# Patient Record
Sex: FEMALE | Race: WHITE
[De-identification: names, ages, dates, MRNs, and addresses within clinical notes are randomized per-mention and may not be internally consistent; named-entity substitution may affect disease eponyms.]

---

## 2019-01-27 ENCOUNTER — HOSPITAL ENCOUNTER (INPATIENT)
Dept: HOSPITAL 80 - FED | Age: 24
LOS: 3 days | Discharge: HOME | DRG: 885 | End: 2019-01-30
Attending: PSYCHIATRY & NEUROLOGY | Admitting: PSYCHIATRY & NEUROLOGY
Payer: COMMERCIAL

## 2019-01-27 DIAGNOSIS — F33.3: Primary | ICD-10-CM

## 2019-01-27 DIAGNOSIS — G89.29: ICD-10-CM

## 2019-01-27 DIAGNOSIS — F43.10: ICD-10-CM

## 2019-01-27 DIAGNOSIS — Z72.0: ICD-10-CM

## 2019-01-27 DIAGNOSIS — F41.0: ICD-10-CM

## 2019-01-27 DIAGNOSIS — Z23: ICD-10-CM

## 2019-01-27 DIAGNOSIS — G80.4: ICD-10-CM

## 2019-01-27 LAB — PLATELET # BLD: 327 10^3/UL (ref 150–400)

## 2019-01-27 PROCEDURE — G0009 ADMIN PNEUMOCOCCAL VACCINE: HCPCS

## 2019-01-27 PROCEDURE — G0480 DRUG TEST DEF 1-7 CLASSES: HCPCS

## 2019-01-27 NOTE — ASMTTCLDSP
[facility] Discharge Disposition

 

Disposition:                  Answers:  Admit                                 

Discharge                     

Concerns/Recommendations:     

Notes:

In consultation with [facility] ED physician, Dr Ferrell and on-call psychiatrist, Dr Foote  , both 

concurred that Pt does appear to meet 27-65 criteria requiring psychiatric hospitalization as Pt 

does appear to be an imminent risk of harm to self/ due to a mental illness condition.

Was patient given the         Answers:  Yes                                   

Inpatient Behavioral                                                          

Health Prohibited                                                             

Belongings List while in                                                      

the ED?                                                                       

For inpatient                 Dr Foote

admission, the following      

psychiatrist agreed to        

accept patient for            

admission to Behavioral       

Health ([facility]):              

Type of Hold:                 Answers:  M1/72-hour Hold                       

Hold initiated by:            Answers:  Other                         Notes:  Clinician

 

Date Signed:  01/27/2019 06:14 PM

Electronically Signed By:Marine Simmons
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
## 2019-01-27 NOTE — EDPHY
H & P


Stated Complaint: SI/plan


Time Seen by Provider: 01/27/19 13:27


HPI/ROS: 





CHIEF COMPLAINT:  Feeling suicidal





HISTORY OF PRESENT ILLNESS:  The patient presents the ED with suicidal thoughts 

with a plan to step in front of a moving vehicle or drive her vehicle into a 

fixed object at a high rate of speed.  The patient has a history of chronic 

intermittent suicidal thoughts.  She also has depression.  She reports that she 

has been compliant with her regular medications.  The patient denies any acute 

medical complaints.  The patient comes to the emergency department today 

because she feels that she requires psychiatric hospitalization for further 

stabilization.





REVIEW OF SYSTEMS:


A comprehensive 10 point review of systems is otherwise negative aside from 

elements mentioned in the history of present illness.








Source: Patient


Exam Limitations: No limitations





- Personal History


LMP (Females 10-55): Over 28 Days Ago


Current Tetanus/Diphtheria Vaccine: Yes


Tetanus Vaccine Date: per patient report 2012





- Medical/Surgical History


Hx Asthma: No


Hx Chronic Respiratory Disease: No


Hx Diabetes: No


Hx Cardiac Disease: No


Hx Renal Disease: No


Hx Cirrhosis: No


Hx Alcoholism: No


Hx HIV/AIDS: No


Hx Splenectomy or Spleen Trauma: No


Other PMH: HPV, CPalsy, MULTIPLE ORTHO SURGERIES, ANXIETY, DEPRESSION, PTSD\

Past hx drug abuse





- Social History


Smoking Status: Heavy smoker





- Physical Exam


Exam: 





General Appearance:  Alert, no distress


Eyes:  Pupils equal and round no pallor or injection


ENT, Mouth:  Mucous membranes moist


Respiratory:  There are no retractions, lungs are clear to auscultation


Cardiovascular:  Regular rate and rhythm


Gastrointestinal:  Abdomen is soft and nontender, no masses, bowel sounds normal


Neurological:  A&O, normal motor function, normal sensory exam, normal cranial 

nerves


Skin:  Warm and dry, no rashes


Musculoskeletal:  Neck is supple nontender


Extremities:  symmetrical, full range of motion


Psychiatric:  Endorses symptoms of depression, suicidal thoughts and a plan.  

Not agitated, normal thought process, conversant and cooperative








Constitutional: 


 Initial Vital Signs











Temperature (C)  36.5 C   01/27/19 13:22


 


Heart Rate  118 H  01/27/19 13:22


 


Respiratory Rate  18   01/27/19 13:22


 


Blood Pressure  141/92 H  01/27/19 13:22


 


O2 Sat (%)  97   01/27/19 13:22








 











O2 Delivery Mode               Room Air














Allergies/Adverse Reactions: 


 





amitriptyline Allergy (Verified 01/27/19 13:26)


 


cyclobenzaprine [From Flexeril] Allergy (Verified 01/27/19 13:26)


 


dicyclomine [From Bentyl] Allergy (Verified 01/27/19 13:26)


 


ibuprofen Allergy (Verified 01/27/19 13:26)


 


ketorolac Allergy (Verified 01/27/19 13:26)


 


latex Allergy (Verified 01/27/19 13:26)


 


naproxen Allergy (Verified 01/27/19 13:26)


 


olanzapine [From Zyprexa] Allergy (Verified 01/27/19 13:26)


 


ziprasidone [From Geodon] Allergy (Verified 01/27/19 13:26)


 








Home Medications: 














 Medication  Instructions  Recorded


 


Albuterol Sulfate [Ventolin Hfa] 1 - 2 puffs IH Q4H PRN 01/27/19


 


Clindamycin 1% [Cleocin 1%] 1 sophie TP DAILY PRN 01/27/19


 


Etonogestrel [Nexplanon] 68 mg SQ ONCE 01/27/19


 


Fluticasone Nasal [Flonase Nasal 2 sprays EACHNARE DAILY 01/27/19





Spray (RX)]  


 


Omeprazole 40 mg PO DAILY 01/27/19


 


PARoxetine HCL [Paxil 30mg (*)] 30 mg PO HS 01/27/19


 


risperiDONE [RisperDAL] 1 mg PO HS 01/27/19














Medical Decision Making


ED Course/Re-evaluation: 





The patient presents to the ED with suicidal thoughts.  She denies any 

ingestion.  She denies any acute medical complaints.





The patient was placed on a mental health detainer in the emergency department.

  She was medically cleared for psychiatric evaluation.





The patient was evaluated by the mental health team.  She has been placed on a 

72 hr mental health hold with a plan for inpatient psychiatric hospitalization.





5:30 p.m.:  The patient has been accepted for involuntary psychiatric 

hospitalization by Dr. Foote at [facility].  I have filled out 

the EMTALA transfer form.














Differential Diagnosis: 





Differential diagnosis considered includes depression, suicidal ideation, 

overdose, metabolic derangement





- Data Points


Laboratory Results: 


 Laboratory Results





 01/27/19 13:50 





 01/27/19 13:50 





 











  01/27/19 01/27/19 01/27/19





  15:20 13:50 13:50


 


WBC      





    


 


RBC      





    


 


Hgb      





    


 


Hct      





    


 


MCV      





    


 


MCH      





    


 


MCHC      





    


 


RDW      





    


 


Plt Count      





    


 


MPV      





    


 


Neut % (Auto)      





    


 


Lymph % (Auto)      





    


 


Mono % (Auto)      





    


 


Eos % (Auto)      





    


 


Baso % (Auto)      





    


 


Nucleat RBC Rel Count      





    


 


Absolute Neuts (auto)      





    


 


Absolute Lymphs (auto)      





    


 


Absolute Monos (auto)      





    


 


Absolute Eos (auto)      





    


 


Absolute Basos (auto)      





    


 


Absolute Nucleated RBC      





    


 


Immature Gran %      





    


 


Immature Gran #      





    


 


Sodium      138 mEq/L mEq/L





     (135-145) 


 


Potassium      3.9 mEq/L mEq/L





     (3.5-5.2) 


 


Chloride      105 mEq/L mEq/L





     () 


 


Carbon Dioxide      21 mEq/l L mEq/l





     (22-31) 


 


Anion Gap      12 mEq/L mEq/L





     (6-14) 


 


BUN      16 mg/dL mg/dL





     (7-23) 


 


Creatinine      0.7 mg/dL mg/dL





     (0.6-1.0) 


 


Estimated GFR      > 60 





    


 


Glucose      97 mg/dL mg/dL





     () 


 


Calcium      10.1 mg/dL mg/dL





     (8.5-10.4) 


 


Beta HCG, Qual    NEGATIVE   





    


 


Urine Opiates Screen  NEGATIVE     





   (NEGATIVE)   


 


Urine Barbiturates  NEGATIVE     





   (NEGATIVE)   


 


Ur Phencyclidine Scrn  NEGATIVE     





   (NEGATIVE)   


 


Ur Amphetamine Screen  NEGATIVE     





   (NEGATIVE)   


 


U Benzodiazepines Scrn  NEGATIVE     





   (NEGATIVE)   


 


Urine Cocaine Screen  NEGATIVE     





   (NEGATIVE)   


 


U Marijuana (THC) Screen  NON-NEGATIVE  H     





   (NEGATIVE)   


 


Ethyl Alcohol      < 10 mg/dL mg/dL





     (0-10) 














  01/27/19





  13:50


 


WBC  6.79 10^3/uL 10^3/uL





   (3.80-9.50) 


 


RBC  4.90 10^6/uL 10^6/uL





   (4.18-5.33) 


 


Hgb  14.8 g/dL g/dL





   (12.6-16.3) 


 


Hct  44.5 % %





   (38.0-47.0) 


 


MCV  90.8 fL fL





   (81.5-99.8) 


 


MCH  30.2 pg pg





   (27.9-34.1) 


 


MCHC  33.3 g/dL g/dL





   (32.4-36.7) 


 


RDW  12.4 % %





   (11.5-15.2) 


 


Plt Count  327 10^3/uL 10^3/uL





   (150-400) 


 


MPV  9.1 fL fL





   (8.7-11.7) 


 


Neut % (Auto)  72.7 % %





   (39.3-74.2) 


 


Lymph % (Auto)  21.9 % %





   (15.0-45.0) 


 


Mono % (Auto)  4.9 % %





   (4.5-13.0) 


 


Eos % (Auto)  0.1 % L %





   (0.6-7.6) 


 


Baso % (Auto)  0.3 % %





   (0.3-1.7) 


 


Nucleat RBC Rel Count  0.0 % %





   (0.0-0.2) 


 


Absolute Neuts (auto)  4.93 10^3/uL 10^3/uL





   (1.70-6.50) 


 


Absolute Lymphs (auto)  1.49 10^3/uL 10^3/uL





   (1.00-3.00) 


 


Absolute Monos (auto)  0.33 10^3/uL 10^3/uL





   (0.30-0.80) 


 


Absolute Eos (auto)  0.01 10^3/uL L 10^3/uL





   (0.03-0.40) 


 


Absolute Basos (auto)  0.02 10^3/uL 10^3/uL





   (0.02-0.10) 


 


Absolute Nucleated RBC  0.00 10^3/uL 10^3/uL





   (0-0.01) 


 


Immature Gran %  0.1 % %





   (0.0-1.1) 


 


Immature Gran #  0.01 10^3/uL 10^3/uL





   (0.00-0.10) 


 


Sodium  





  


 


Potassium  





  


 


Chloride  





  


 


Carbon Dioxide  





  


 


Anion Gap  





  


 


BUN  





  


 


Creatinine  





  


 


Estimated GFR  





  


 


Glucose  





  


 


Calcium  





  


 


Beta HCG, Qual  





  


 


Urine Opiates Screen  





  


 


Urine Barbiturates  





  


 


Ur Phencyclidine Scrn  





  


 


Ur Amphetamine Screen  





  


 


U Benzodiazepines Scrn  





  


 


Urine Cocaine Screen  





  


 


U Marijuana (THC) Screen  





  


 


Ethyl Alcohol  





  














Departure





- Departure


Disposition: [facility] IP


Clinical Impression: 


 Suicidal ideation





Condition: Good


Referrals: 


NONE *PRIMARY CARE P,. [Primary Care Provider] - As per Instructions

## 2019-01-28 RX ADMIN — FLUTICASONE PROPIONATE SCH: 50 SPRAY, METERED NASAL at 14:16

## 2019-01-28 RX ADMIN — PANTOPRAZOLE SODIUM SCH MG: 40 TABLET, DELAYED RELEASE ORAL at 09:44

## 2019-01-28 NOTE — PDMN
Medical Necessity


Medical necessity: [facility] [place] [id number], Major Depressive Disorder, Adult: Inpatient Care

, 3 days:  22 yo w/ major depressive do, recurrent, severe, suicidal ideation, 

risk of self harm, M1 hold.  Other dx include PTSD and borderline personality d/

o.

## 2019-01-28 NOTE — GCON
MEDICINE CONSULTATION



DATE OF CONSULTATION:  01/27/2019



This is a medicine consultation at the request of Behavioral Health for general medical evaluation an
d management prior to behavioral health hospitalization.



CHIEF COMPLAINT:  Suicidal.



HISTORY:  This is a 23-year-old female who has a past medical history that includes cerebral palsy, a
s well as anxiety, depression, and PTSD.  The patient notes that she has chronic pain due to her cere
bral palsy and multiple surgical interventions in the past.  She presents to the ER with concerns tammy
t she is going to kill herself.  She states she had a plan to throw herself in front of a car.  At th
e time of my evaluation, the patient is somewhat anxious and tearful and states she feels as if she m
ight cry or scream, but otherwise states that she feels safe and is glad that she is here to get some
 help.  She denies any acute medical concerns at this time.



PAST MEDICAL HISTORY:  Includes:

1.  Cerebral palsy.

2.  HPV.

3.  Anxiety.

4.  Depression.

5.  PTSD.

6.  Prior suicidal ideation.



PAST SURGICAL HISTORY:  Multiple surgeries from her childhood on involving the waist down.  She notes
 essentially every tendon, muscle, etc., has been operated on at some point or another due to her cer
ebral palsy.



FAMILY HISTORY:  Patient is adopted and her birth family history is unknown.



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  She is a d
aily heavy smoker.  She drinks alcohol occasionally.  She denies other drug use, but has a prior hist
ory of polysubstance abuse.



REVIEW OF SYSTEMS:  A 10-point review of systems obtained and negative except as per HPI.



HOME MEDICATIONS:  Include:

1.  Clindamycin topical cream.

2.  Flonase nasal spray.

3.  Nexplanon.

4.  Albuterol.

5.  Risperdal.

6.  Paxil.

7.  Omeprazole.



ALLERGIES:  Multiple.  Please see the EHR for further details.



PHYSICAL EXAM:  VITAL SIGNS:  /89, heart rate 92, respiratory rate 16, O2 sat is 98% on room ai
r, temperature is 36.7.  GENERAL APPEARANCE:  This is a young  female.  She is awake and erinn
rt.  She is in no acute distress.  EYES:  Anicteric.  HENT:  Oropharynx clear.  CARDIOVASCULAR:  Regu
lar rate and rhythm.  No MRG.  PULMONARY:  CTA bilaterally.  Normal work of breathing.  ABDOMEN:  Sof
t, nontender, nondistended.  EXTREMITIES:  No clubbing, cyanosis, or edema.  SKIN:  Warm, dry, well p
erfused.  NEURO/PSYCH:  Anxious, otherwise oriented and appropriate.



CLINICAL DATA:  Labs reviewed.  CBC is unremarkable.  Chemistry likewise unremarkable.  Beta HCG is n
egative.  Tox screen is positive for marijuana.



ASSESSMENT/PLAN:  This is a 23-year-old female with a past medical history of cerebral palsy and 
karen pain as well as anxiety, depression, posttraumatic stress disorder and prior suicidal ideation, p
resenting with suicidal ideation.



PROBLEM:  

1.  Suicidal ideation.  The patient presents with acute on chronic suicidal ideation with a plan to t
hrow herself in front of a quickly moving car.  She is tearful and anxious on exam.  She does not fee
l safe to return home and has been accepted to the behavioral health unit for medical stabilization. 
 I do not see any medical reason to defer any treatment felt to be indicated by the psychiatric servi
ce and further treatment will be deferred to their discretion.  

2.  Chronic pain.  She has been managing this with Tylenol and physical therapy.  She does admit that
 she has not been compliant with physical therapy as late and this likely would be a good choice for 
her moving forward.

3.  Cerebral palsy with gait instability and history of multiple surgeries and chronic pain as per ab vlilavicencio.  Again, recommending PT.

4.  Anxiety, depression, PTSD.  As per problem #1, this will be per the discretion of her behavioral 
health team. 



Thank you for this consultation.  Medicine will be available peripherally.  Should questions arise du
ring patient's hospitalization.  



The patient is new to my care.  Old records reviewed, summarized as per HPI and past medical history.
  Care plan reviewed with ER physician.





Job #:  [id number]

## 2019-01-28 NOTE — ASMTBHMTP
Master Treatment Plan

 

Master Treatment Plan         Answers:  Depressed Mood with                   

for:                                    Suicidal Ideation                     

Date:                         01/28/2019

Diagnosis on Admission:       Major Depressive Disorder, Recurrent, Severe 296.33 

                              (F33.2)

Expected length of stay:      3-5 days

Reason for admission:         

Notes:

Per Report:



Pt is a 22 y/o female who presents to the ED voluntarily due to concerns of self-harm. Pt has an 

extensive hx of complicated trauma and was never able to develop a secure attachment due to some of 


this trauma occuring at a very young age. Clinician placed pt on an M1 following the 

evaluation.  Per M1, "Pt has multiple past suicide attempts, including 1 in a hospital.  Her SI has 


been increasing in both frequency and intensity this past month. She has multiple plans.  Presented 


voluntarily due to her fear of self-harm".  



Pt has an extensive psychiatric hx with over 20 hospitalizations and multiple suicide 

attempts.  Pt's last hospitalization was at [facility] in June 2018.  Pt stated a suicide 

attempt brought her to the hospital and that while she was in [facility] she attempted 

suicide; she was found unconscious. Pt cannot recall what either attempt was.  Prior to that 

hospitalization she had been receiving ECT for approximately 6 months.  Pt reports a baseline of 

severe depression and anxiety, but a noticeable increase in her ability to function over the last 

few months.  She has held a job since September, able to buy herself a car and begin to pay off 

loans.  This past month she observed that her SI, which is ongoing, was increasing in both 

frequency and intensity.  This past week she noticed agitation and irritability; these are moods 

that are not usual for her. She and her therapist cannot link her changes in mood with a specific 

event and were surprised at their presence given her personal achievements. She endorsed an 

increase in hopelessness, guilt, worthlessness,difficulty focusing, difficulty making 

decisions, difficulty enjoying herself and a decrease in energy. She reports great difficulty 

sleeping.  She struggles to fall asleep, stay asleep and has nightmares. Her appetite vascillates 

and has decreased over the past 2 weeks. During the evaluation she presented multiple plans for 

killing herself which include using her car and drinking chemicals. She presents these to the 

clinician with a dissasociated bright affect.(she has been tearful several times) Her therapist has 


been encouraging her to be hospitalized.  She was pleased to be pro-active and come in voluntary. 



 Pt has a hx of cutting, but has not cut since at least last June.  She does find herself 

scratching herself when her PTSD is triggered. 



Pt has generalized anxiety which takes the form of continual worrying and questioning of 

herself.  She also experiences multiple symptoms of PTSD which include triggering, intrusive 

memories, hypervigilence and nightmares.  She has been given the diagnosis of reactive attachment 

disorder and borderline personality disorder in the past; these both suggest that she may struggle 

to regulate her emotions.  Clinician did ask her if her suicidal acts were more impulsive, as a 

reaction to an event or did the intent grow  over time; she believes that the intent  grows over 

time.

Patient's stated              

presenting problems:          

Notes:

"suicidal ideations."

Patient's goals for           

treatment:                    

Notes:

to get better

Patient's strengths:          

Notes:

none

Identify supports outside     

of hospital:                  

Notes:

family and friends

Discharge criteria:           

Notes:

Suicidal Ideation will resolve and patient will have a plan to safely manage recurrent suicidal 

ideation.

Initial disposition           

plan/considerations:          

Notes:

Possibly return home with parents, don't know yet.

Master Treatment Plan Required Signatures

 

Psychiatrist signature:       Answers:  Psychiatrist: ______________________________

RN on-shift signature:        Answers:  RN: ____________________________________

Patient signature:            Answers:  Patient: ____________________________________

 

Date Signed:  01/28/2019 07:41 AM

Electronically Signed By:Evelio Castellanos
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
## 2019-01-29 RX ADMIN — PANTOPRAZOLE SODIUM SCH MG: 40 TABLET, DELAYED RELEASE ORAL at 08:47

## 2019-01-29 RX ADMIN — ACETAMINOPHEN SCH MG: 500 TABLET ORAL at 19:31

## 2019-01-29 RX ADMIN — FLUTICASONE PROPIONATE SCH SPRAYS: 50 SPRAY, METERED NASAL at 09:03
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
## 2019-01-30 VITALS — DIASTOLIC BLOOD PRESSURE: 58 MMHG | SYSTOLIC BLOOD PRESSURE: 129 MMHG

## 2019-01-30 RX ADMIN — PANTOPRAZOLE SODIUM SCH MG: 40 TABLET, DELAYED RELEASE ORAL at 08:15

## 2019-01-30 RX ADMIN — FLUTICASONE PROPIONATE SCH SPRAYS: 50 SPRAY, METERED NASAL at 08:15

## 2019-01-30 RX ADMIN — ACETAMINOPHEN SCH MG: 500 TABLET ORAL at 08:14

## 2019-01-30 NOTE — BDS
[f 
rep st]



                                                  BEHAVIORAL HEALTH DISCHARGE 
SUMMARY





REASON FOR ADMISSION:  From the ED note dated 01/27/2019, patient presented to 
the emergency department with suicidal thoughts with a plan to step in front of 
a moving vehicle or drive her vehicle into a fixed object at a high rate of 
speed.  The patient was admitted involuntarily on an M1 hold due to being a 
danger to herself.  The patient was admitted for safety, crisis stabilization, 
and medication management.



ADMITTING DIAGNOSES:  

1.  Major depressive disorder, recurrent episode, severe, with anxious distress.

2.  Posttraumatic stress disorder. 

3.  Panic disorder.



ADMISSION PHYSICAL EXAM:  The patient was seen for history and physical on 01/27
/2019, by Internal Medicine consultation for medical clearance for inpatient 
psychiatric hospitalization and treatment.  The patient was medically cleared 
for inpatient psychiatric hospitalization and treatment.  For further details, 
please refer to consultation note dated 01/27/2019.



ADMISSION LABS:  

1.  CBC within normal limits, except eosinophils were low at 0.1, absolute 
eosinophils were low at 0.01. 

2.  BMP within normal limits, except carbon dioxide was low at 21.

3.  Hemoglobin A1c within normal limits at 5.0.

4.  Liver function within normal limits, except AST was elevated at 50.  Total 
protein was elevated at 8.5.

5.  Lipid panel within normal limits, except LDL cholesterol calculated was 
elevated at 113, VLDL cholesterol was elevated at 26, non-HDL cholesterol was 
elevated at 139.

6.  Beta-hCG qualitative pregnancy test was negative.

7.  Toxicology screen was negative for marijuana, negative for all other 
substances of abuse and negative for ethyl alcohol.



MAJOR PROCEDURES OR TESTS:  None.



HOSPITAL COURSE:  The most prominent symptoms and behaviors while the patient 
was here were reports of severe anxiety and depression.  Treatment modalities 
utilized were milieu and group therapy.  Paxil was increased at time of 
admission to 40 mg p.o. q.h.s. to target mood symptoms.  It was tolerated with 
no report of side effects and with good response.  Klonopin trial with starting 
dose of 0.25 mg p.o. b.i.d. was titrated to 0.5 mg p.o. b.i.d.  The patient 
reported poor response.  Klonopin was discontinued.  Ativan 1 mg p.o. q.6 hours 
p.r.n. was started to target anxiety and panic symptoms.  It was tolerated with 
no report of side effects and with good response.  Prazosin 1 mg p.o. q.h.s. 
was started to target nightmares related to posttraumatic stress disorder and 
was tolerated with no report of side effects and with good response.  The 
patient has improved considerably with no signs of psychiatric symptoms and no 
psychiatric symptoms expressed at time of discharge.  The patient reports she 
has improved since admission, states to be in stable condition, feels safe to 
discharge, and she contracts for safety.  The patient's response to treatment 
was good.  There were no adverse or unexpected results of treatment.  The 
patient was safe throughout her stay, active in treatment, engaged in groups, 
and was appropriate with staff and other patients.  The patient met with the 
treatment team prior to discharge to assess readiness to discharge and review 
discharge plan.  The treatment team consensus is the patient is in stable 
condition, has a safe discharge plan, and is ready to discharge today.



CONDITION AT DISCHARGE:  Patient is in stable condition and is no longer a 
danger to self or others, and is not gravely disabled due to mental illness.  
Patient is no longer in need of inpatient level of care, and can be safely and 
effectively treated within the community.  The patients level of risk at time 
of discharge is low.  MSE: The patient is casually dressed and with good hygiene
, and looks stated age.  Patient is sitting, posture is upright, and position 
is relaxed.  Patient appears awake, alert, and responds appropriately and 
reasonably during interview.  Patient is engaged, relates well to interviewer, 
and emotional facial expression is appropriate to situation and changes 
appropriately with topic.  Patient is cooperative, makes comfortable eye contact
, and movements are voluntary, deliberate, coordinated, and smooth and even 
with no inappropriate movements.  Patient makes laryngeal sounds effortlessly 
and shares conversation appropriately; pace of conversation is appropriate, and 
stream of talking is fluent; articulation is clear and understandable; word 
choice is effortless and appropriate for education level; completes sentences, 
occasionally pausing to think; rate and volume are appropriate for interview 
and setting.  Patient reports mood as euthymic.  Patients affect is stable with 
full variable range, congruent with mood, and appropriate to speech and 
circumstances.  Patient has linear and logical thinking, with no loose 
associations, tangential thought, thought blocking, concrete thinking, or any 
other signs of formal thought disorder.  Patient denies suicidal and homicidal 
ideation, and denies hallucinations and delusions.  Patient appears to be a 
reliable historian with sound judgement and good insight into current 
condition.  Patient has no apparent dysfunction in recent or remote memory noted
, and no evidence of gross cognitive dysfunction noted at any point during the 
interview.



DISCHARGE DIAGNOSES: 

1.  Major depressive disorder, recurrent episode, severe, with anxious distress.

2.  Posttraumatic stress disorder. 

3.  Panic disorder.



CURRENT MEDICATIONS:  After reviewing options, risks, and benefits with the 
patient, the patient agrees to continue:

1.  Paxil 40 mg p.o. q.h.s.

2.  Prazosin 1 mg p.o. q.h.s.

3.  Ativan 1 mg p.o. b.i.d. p.r.n.

4.  Tylenol 1000 mg p.o. q.12 hours p.r.n.

5.  Protonix 40 mg p.o. q. day.

6.  Flonase 2 sprays each naris q. day.

7.  Albuterol sulfate 1-2 puffs inhaled q.4 hours p.r.n.

8.  Omeprazole 40 mg p.o. q. day.

9.  Clindamycin 1% one application topical q. day p.r.n.  



At time of discharge, the patient requests prescriptions for Paxil, prazosin, 
and Ativan.  Prescriptions for 30 days are provided.  The prescriptions are 
reviewed with the patient at time of discharge to ensure patient understanding 
and accuracy.  The patient left the hospital independently and voluntarily and 
plans to return home where she lives with her parents.



FOLLOWUP:  Care coordinator reports the appropriate outpatient follow-up 
services have been established and outpatient appointments have been scheduled.
  The patient received written instructions with times and dates of outpatient 
follow-up appointments.  The following follow-up recommendations were provided 
to the patient at discharge: Continue psychotropic medications as prescribed 
and attend appointments as scheduled.  Report any side effects to a psychiatric 
outpatient provider, a primary care provider, or other health care 
professional.  Address any questions or problems concerning the psychotropic 
medications with a psychiatric outpatient provider, a primary care provider, or 
other health care professional.  Contact [facility] or [facility], or go 
to the nearest emergency room, if you are ever a danger to yourself/others, or 
unable to care for yourself.  As soon as possible, establish a routine 
medication management treatment with a psychiatric provider, establish routine 
therapy appointments, and follow-up with a primary care provider.  



LEGAL COURSE:  The patient was admitted on an M1 hold.  The patient became 
voluntary during her stay.  The patient discharged today independently and 
voluntarily.



ATTITUDE AT TIME OF DISCHARGE:  The patients attitude was positive at time of 
discharge, and patient reports looking forward to discharging today.  The 
patient reports she feels safe to discharge, is no longer a danger to herself 
or others, is in stable condition, and contracts for safety.  Patient states 
she will continue medications as prescribed, and establish medication 
management treatment with an outpatient provider after discharge.  Patient 
reports she understands the information that has been provided to her, and she 
understands, accepts, and agrees to psychotropic medications.  Patient 
describes internal protective factors as the coping skills she has learned 
while hospitalized here, and she plans to continue to practice these coping 
skills after discharge.  



LABS AND STUDIES:  There were no pending labs or studies at time of discharge.



ADVANCE DIRECTIVES:  There were no advance directives on file, and patient was 
full code during this hospitalization.



The following psychotropic medication treatment informed consent and 
recommendations were provided to the patient at time of discharge.  Patient 
reports she understands, accepts, and agrees to the information that has been 
provided.   



PSYCHOTROPIC MEDICATION TREATMENT INFORMED CONSENT and RECOMMENDATIONS: Review 
nature of condition, diagnosis, and prognosis.  Review nature and purpose of 
psychotropic medication treatment. Review type of psychotropic medications 
being prescribed.  Review risk and benefits of psychotropic medication 
treatment.  Review probable length of time will need to take medications.  
Review risk and benefits of not undergoing psychotropic medication treatment.  
Review alternative treatments to psychotropic medications.  Review psychotropic 
medications contraindications, side effects, and importance of reporting any 
side effects to a psychiatric provider, primary care provider, or other health 
care professional.  Review importance of her asking a psychiatric provider or 
primary care provider any questions or problems concerning the psychotropic 
medications.  Review importance of reporting to a psychiatric provider, primary 
care provider, or other health care professional if she plans to or becomes 
pregnant.  



Review safety plan and the importance to contact [facility] or [facility]
, or go to the nearest emergency room, if ever a danger to yourself/others, or 
unable to care for yourself.  Recommend upon discharge to establish routine 
medication management treatment with a psychiatric provider, establish routine 
therapy appointments, and follow-up with a primary care provider.  Verify 
patient understands, accepts, and agrees to the information that has been 
provided. 







Job #:  [id number]

MTDD